# Patient Record
Sex: FEMALE | Race: WHITE
[De-identification: names, ages, dates, MRNs, and addresses within clinical notes are randomized per-mention and may not be internally consistent; named-entity substitution may affect disease eponyms.]

---

## 2018-02-02 ENCOUNTER — HOSPITAL ENCOUNTER (EMERGENCY)
Dept: HOSPITAL 45 - C.EDB | Age: 23
Discharge: HOME | End: 2018-02-02
Payer: COMMERCIAL

## 2018-02-02 VITALS
WEIGHT: 150.13 LBS | WEIGHT: 150.13 LBS | BODY MASS INDEX: 30.27 KG/M2 | HEIGHT: 59.02 IN | HEIGHT: 59.02 IN | BODY MASS INDEX: 30.27 KG/M2

## 2018-02-02 VITALS — HEART RATE: 65 BPM | SYSTOLIC BLOOD PRESSURE: 131 MMHG | DIASTOLIC BLOOD PRESSURE: 72 MMHG | OXYGEN SATURATION: 97 %

## 2018-02-02 VITALS — TEMPERATURE: 98.6 F

## 2018-02-02 DIAGNOSIS — T78.40XA: Primary | ICD-10-CM

## 2018-02-02 DIAGNOSIS — E03.9: ICD-10-CM
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
## 2018-02-02 DIAGNOSIS — Z83.3: ICD-10-CM

## 2018-02-02 DIAGNOSIS — Z79.3: ICD-10-CM

## 2018-02-02 DIAGNOSIS — Z82.49: ICD-10-CM

## 2018-02-02 NOTE — EMERGENCY ROOM VISIT NOTE
History


Report prepared by Jordana:  Madelyn Latham


Under the Supervision of:  Dr. Los Gmóez D.O.


First contact with patient:  12:52


Chief Complaint:  ALLERGIC REACTION


Stated Complaint:  CLOSED THROAT FEELING


Nursing Triage Summary:  


Pt has allergy to raw fruit. Made a smoothie today with raw fruit and tried it 


and feels like her throat is scratchy and tight, not getting worse, no 

difficult 


breathing, but throat tightness persists after taking benadryl at home.





History of Present Illness


The patient is a 22 year old female who presents to the Emergency Room with 

complaints of episodic allergic reaction for two hours. She states that she 

drank a smoothie with strawberries and other fresh fruit this morning and her 

throat began to tighten and became itchy. She felt jittery, though reports 

drinking coffee this morning. She has consumed smoothies in the past without 

issue, though notes similar symptoms when she was 15 year old. She states that 

her throat swelled when she at fruit at 15 years old, so she stopped eating 

them. She denies any rashes. She took a Benadryl at the initial start of the 

symptoms, though no relief. She denies any shortness of breath. She denies any 

history of diabetes. She has a history of hypothyroidism.





   Source of History:  patient


   Onset:  two hours


   Position:  other (global )


   Quality:  other (allergic reaction)


   Timing:  other (episodic)


   Associated Symptoms:  No SOB, No rash


Note:


She notes throat tightening and itchy.





Review of Systems


See HPI for pertinent positives & negatives. A total of 10 systems reviewed and 

were otherwise negative.





Past Medical & Surgical


Medical Problems:


(1) Hypothyroidism








Family History





Diabetes mellitus


Hypertension





Social History


Smoking Status:  Never Smoker


Smokeless Tobacco Use:  No


Alcohol Use:  occasionally


Marital Status:  single


Housing Status:  lives with roommate


Occupation Status:  student





Current/Historical Medications


Scheduled


Birth Control Pills (Birth Control Pills), 1 TAB PO DAILY


Methimazole (Methimazole), Unknown Dose PO DAILY





Allergies


Coded Allergies:  


     Azithromycin (Unverified  Allergy, Unknown, SWELLING, 2/2/18)





Physical Exam


Vital Signs











  Date Time  Temp Pulse Resp B/P (MAP) Pulse Ox O2 Delivery O2 Flow Rate FiO2


 


2/2/18 13:34  65 18 131/72 97   


 


2/2/18 12:49     95 Room Air  


 


2/2/18 12:48 37.0 75 18 142/85 95 Room Air  











Physical Exam


CONSTITUTIONAL/VITAL SIGNS: Reviewed / noted above.


GENERAL: Non-toxic in appearance. 


INTEGUMENTARY: Warm, dry, and Pink.


HEAD: Normocephalic.


EYES: without scleral icterus or trauma.


ENT/OROPHARYNX: clear and moist.


LYMPHADENOPATHY/NECK: Is supple without lymphadenopathy or meningismus.


RESPIRATORY: Lungs clear and equal.


CARDIOVASCULAR: Regular rate and rhythm.


GI/ABDOMEN: Soft and nontender. No organomegaly or pulsatile mass. No rebound 

or guarding. Normal bowel sounds.


EXTREMITIES: Warm and well perfused.


BACK: No CVA tenderness.


NEUROLOGICAL: Intact without focal deficits. 


PSYCHIATRIC: normal affect.


MUSCULOSKELETAL: Normally developed with good muscle tone.





Medical Decision & Procedures


Medications Administered











 Medications


  (Trade)  Dose


 Ordered  Sig/Marce


 Route  Start Time


 Stop Time Status Last Admin


Dose Admin


 


 Dexamethasone


 Sodium Phosphate


  (Dexamethasone


 Inj **Pf**)  10 mg  STK-MED ONCE


 .ROUTE  2/2/18 13:24


 2/2/18 13:25 DC 2/2/18 13:24


5 MG











ED Course


1254: Previous medical records were reviewed. The patient was evaluated in room 

C11B. A complete history and physical examination was performed. I discussed 

the results and treatment plan with the patient. I answered all pertaining 

questions that she had. She expressed understanding and verbalized agreement. 

The patient will be discharged home.





1315: Ordered Decadron 5 mg IM





Medical Decision


Differential includes anaphylaxis, allergic reaction, airway obstruction, and 

infection.











This is a 22-year-old female who presents to the ED with a chief complaint of 

possible allergic reaction to fresh fruits.  The patient states that she had a 

smoothie that involved fresh strawberries and began to feel tingling in her 

throat.  She took a Benadryl around 11 AM shortly after her symptoms started.  

She came in for evaluation because it persisted.  The patient's exam was 

unremarkable.  She denied having any hives or other symptoms of allergic 

reaction.  The patient did not have any evidence of oropharyngeal erythema or 

mucous membrane involvement.  She was given an IM injection of Decadron to help 

with her symptoms.  She was felt to be stable for discharge.





Medication Reconcilliation


Current Medication List:  was personally reviewed by me





Blood Pressure Screening


Patient's blood pressure:  Elevated blood pressure


Blood pressure disposition:  Elevated BP felt to be situational





Impression





 Primary Impression:  


 Allergic reaction





Scribe Attestation


The scribe's documentation has been prepared under my direction and personally 

reviewed by me in its entirety. I confirm that the note above accurately 

reflects all work, treatment, procedures, and medical decision making performed 

by me.





Departure Information


Dispostion


Home / Self-Care





Referrals


No Doctor, Assigned (PCP)





Forms


HOME CARE DOCUMENTATION FORM,                                                 

               IMPORTANT VISIT INFORMATION





Patient Instructions


My [facility]





Additional Instructions











Follow-up with your doctor for further care and evaluation in 1-2 days.  Return 

to the emergency department for worsening or new symptoms or any concerns.


You have been examined and treated today on an emergency basis only. This is 

not a substitute for, or an effort to provide, complete comprehensive medical 

care. It is impossible to recognize and treat all injuries or illnesses in a 

single emergency department visit. It is therefore important that you follow up 

closely with your doctor.  Call as soon as possible for an appointment.





Take Benadryl every 6 hours as needed for itching or tingling and throat.

## 2018-04-20 ENCOUNTER — HOSPITAL ENCOUNTER (EMERGENCY)
Dept: HOSPITAL 45 - C.EDB | Age: 23
Discharge: HOME | End: 2018-04-20
Payer: COMMERCIAL

## 2018-04-20 VITALS
BODY MASS INDEX: 29.33 KG/M2 | HEIGHT: 59.02 IN | HEIGHT: 59.02 IN | BODY MASS INDEX: 29.33 KG/M2 | WEIGHT: 145.51 LBS | WEIGHT: 145.51 LBS

## 2018-04-20 VITALS — TEMPERATURE: 98.6 F

## 2018-04-20 VITALS — SYSTOLIC BLOOD PRESSURE: 121 MMHG | HEART RATE: 82 BPM | DIASTOLIC BLOOD PRESSURE: 61 MMHG | OXYGEN SATURATION: 98 %

## 2018-04-20 DIAGNOSIS — Z79.3: ICD-10-CM

## 2018-04-20 DIAGNOSIS — Z79.899: ICD-10-CM

## 2018-04-20 DIAGNOSIS — R20.2: Primary | ICD-10-CM

## 2018-04-20 DIAGNOSIS — Z88.1: ICD-10-CM

## 2018-04-20 DIAGNOSIS — E03.9: ICD-10-CM

## 2018-04-20 DIAGNOSIS — I49.3: ICD-10-CM

## 2018-04-20 DIAGNOSIS — R51: ICD-10-CM

## 2018-04-20 LAB
ALBUMIN SERPL-MCNC: 3.9 GM/DL (ref 3.4–5)
ALP SERPL-CCNC: 100 U/L (ref 45–117)
ALT SERPL-CCNC: 20 U/L (ref 12–78)
AST SERPL-CCNC: 21 U/L (ref 15–37)
BASOPHILS # BLD: 0.01 K/UL (ref 0–0.2)
BASOPHILS NFR BLD: 0.1 %
BUN SERPL-MCNC: 11 MG/DL (ref 7–18)
CALCIUM SERPL-MCNC: 9.1 MG/DL (ref 8.5–10.1)
CO2 SERPL-SCNC: 24 MMOL/L (ref 21–32)
CREAT SERPL-MCNC: 0.74 MG/DL (ref 0.6–1.2)
EOS ABS #: 0 K/UL (ref 0–0.5)
EOSINOPHIL NFR BLD AUTO: 369 K/UL (ref 130–400)
GLUCOSE SERPL-MCNC: 76 MG/DL (ref 70–99)
HCT VFR BLD CALC: 41.3 % (ref 37–47)
HGB BLD-MCNC: 14.2 G/DL (ref 12–16)
IG#: 0.02 K/UL (ref 0–0.02)
IMM GRANULOCYTES NFR BLD AUTO: 30.3 %
LYMPHOCYTES # BLD: 2.56 K/UL (ref 1.2–3.4)
MCH RBC QN AUTO: 29.5 PG (ref 25–34)
MCHC RBC AUTO-ENTMCNC: 34.4 G/DL (ref 32–36)
MCV RBC AUTO: 85.9 FL (ref 80–100)
MONO ABS #: 0.57 K/UL (ref 0.11–0.59)
MONOCYTES NFR BLD: 6.7 %
NEUT ABS #: 5.3 K/UL (ref 1.4–6.5)
NEUTROPHILS # BLD AUTO: 0 %
NEUTROPHILS NFR BLD AUTO: 62.7 %
PMV BLD AUTO: 11 FL (ref 7.4–10.4)
POTASSIUM SERPL-SCNC: 3.4 MMOL/L (ref 3.5–5.1)
PROT SERPL-MCNC: 9 GM/DL (ref 6.4–8.2)
RED CELL DISTRIBUTION WIDTH CV: 12.9 % (ref 11.5–14.5)
RED CELL DISTRIBUTION WIDTH SD: 40.8 FL (ref 36.4–46.3)
SODIUM SERPL-SCNC: 136 MMOL/L (ref 136–145)
WBC # BLD AUTO: 8.46 K/UL (ref 4.8–10.8)

## 2018-04-20 NOTE — DIAGNOSTIC IMAGING REPORT
CHEST ONE VIEW PORTABLE



CLINICAL HISTORY: racing hr cardiac arrhythmia



COMPARISON STUDY:  No previous studies for comparison.



FINDINGS: The bones soft tissues and hemidiaphragms are normal. The

cardiomediastinal silhouette is normal. The lungs are clear. The pulmonary

vasculature is normal. 



IMPRESSION:  Negative chest. 











The above report was generated using voice recognition software.  It may contain

grammatical, syntax or spelling errors.









Electronically signed by:  Kenny Pérez M.D.

4/20/2018 9:07 PM



Dictated Date/Time:  4/20/2018 9:07 PM

## 2018-04-20 NOTE — DIAGNOSTIC IMAGING REPORT
HEAD WITHOUT CONTRAST (CT)



CT DOSE: 537.48 mGy.cm



HISTORY: Mental status change. Paresthesias.  parestesias



TECHNIQUE: Multiaxial CT images of the head were performed without the use of

intravenous contrast.  A dose lowering technique was utilized adhering to the

principles of ALARA.





Comparison: None.



Findings: The paranasal sinuses and mastoid air cells are clear. The calvarium

and skull base are intact. The ventricles and sulci are within normal limits.

There is no mass, hematoma, midline shift, or acute infarct.



Impression:

No acute intracranial abnormality. 











The above report was generated using voice recognition software.  It may contain

grammatical, syntax or spelling errors.







Electronically signed by:  Kenny Pérez M.D.

4/20/2018 9:39 PM



Dictated Date/Time:  4/20/2018 9:38 PM

## 2018-04-20 NOTE — EMERGENCY ROOM VISIT NOTE
History


Report prepared by Jordana:  Carlito Fernandez


Under the Supervision of:  Dr. Los Gómez D.O.


First contact with patient:  20:28


Chief Complaint:  RAPID HEART RATE


Stated Complaint:  NUMBNESS ON LEFT SIDE BODY/ RAPID HEART BEAT


Nursing Triage Summary:  


patient to Ed via triage, states "I had some caffeine this morning and then my 


heart felt like it was racing, my left arm neeru went numb and was tingly."





History of Present Illness


The patient is a 22 year old female who presents to the Emergency Room with 

complaints of constant numbness beginning today. The patient states that she 

had coffee this morning which caused her heart rate to increase and make her 

feel jittery. She notes that she began to feel numbness and tingling in her 

left arm, left cheek, right hand, as well as a dry left eye. She reports that 

she also has a headache but did not get much sleep last night and believes that 

her headache might be related to her lack of sleep. The patient states that she 

has a history of hypothyroidism and takes methimazole. She notes that she has 

not experienced similar symptoms in the past.





   Source of History:  patient


   Onset:  today


   Position:  arm (left), hand (right), other (Left cheek)


   Quality:  numbness


   Timing:  constant


   Associated Symptoms:  + headache


Note:


The patient also complains of an increased heart rate and a dry left eye.





Review of Systems


See HPI for pertinent positives & negatives. A total of 10 systems reviewed and 

were otherwise negative.





Past Medical & Surgical


Medical Problems:


(1) Hypothyroidism








Family History





Diabetes mellitus


Hypertension





Social History


Smoking Status:  Never Smoker


Alcohol Use:  occasionally


Marital Status:  single


Housing Status:  lives with roommate


Occupation Status:  [employer] student





Current/Historical Medications


Scheduled


Birth Control Pills (Birth Control Pills), 1 TAB PO DAILY


Bismuth Subsalicylate (Pepto Bismol Chew Tab), 1-2 TAB PO PRN UD


Methimazole (Methimazole), 10 MG PO TID


Probiotic Product (Probiotic), 1 CAP PO DAILY





Allergies


Coded Allergies:  


     Azithromycin (Unverified  Allergy, Unknown, SWELLING, 2/2/18)





Physical Exam


Vital Signs











  Date Time  Temp Pulse Resp B/P (MAP) Pulse Ox O2 Delivery O2 Flow Rate FiO2


 


4/20/18 20:47  91      


 


4/20/18 20:43  85 14 126/67 99 Room Air  


 


4/20/18 18:22     100 Room Air  


 


4/20/18 18:20 37.0 83 20 151/85 100 Room Air  











Physical Exam


VITAL SIGNS: were reviewed as above.


GENERAL:Non-toxic in appearance.


SKIN: Warm dry and pink.


HEAD: Normocephalic and atraumatic.


OROPHARYNX: Is clear and moist


NECK: Supple without lymphadenopathy or meningismus.


LUNGS: clear.


HEART: Regular rate and rhythm.


ABDOMEN: Soft and nontender.


EXTREMITIES: Warm and well perfused.


NEUROLOGICALLY: Awake alert and oriented without focal deficit. Cranial nerves 2

-12 are intact. There is no pronator drift. Cerebellar testing is within normal 

limits. There is no nystagmus. There is no facial droop. Speech is clear. 

Vision is grossly normal.


MUSCULOSKELETAL: Good muscle tone. No evidence of trauma.





Medical Decision & Procedures


ER Provider


Diagnostic Interpretation:


Radiology results as stated below per my review and radiologist interpretation:





CHEST ONE VIEW PORTABLE





FINDINGS: The bones soft tissues and hemidiaphragms are normal. The


cardiomediastinal silhouette is normal. The lungs are clear. The pulmonary


vasculature is normal. 





IMPRESSION:  Negative chest. 





The above report was generated using voice recognition software.  It may contain


grammatical, syntax or spelling errors.





Electronically signed by:  Kenny Pérez M.D.


4/20/2018 9:07 PM





HEAD WITHOUT CONTRAST (CT)





Findings: The paranasal sinuses and mastoid air cells are clear. The calvarium


and skull base are intact. The ventricles and sulci are within normal limits.


There is no mass, hematoma, midline shift, or acute infarct.





Impression:


No acute intracranial abnormality. 





The above report was generated using voice recognition software.  It may contain


grammatical, syntax or spelling errors.





Electronically signed by:  Kenny Pérez M.D.


4/20/2018 9:39 PM





Laboratory Results


4/20/18 19:33








Red Blood Count 4.81, Mean Corpuscular Volume 85.9, Mean Corpuscular Hemoglobin 

29.5, Mean Corpuscular Hemoglobin Concent 34.4, Mean Platelet Volume 11.0, 

Neutrophils (%) (Auto) 62.7, Lymphocytes (%) (Auto) 30.3, Monocytes (%) (Auto) 

6.7, Eosinophils (%) (Auto) 0.0, Basophils (%) (Auto) 0.1, Neutrophils # (Auto) 

5.30, Lymphocytes # (Auto) 2.56, Monocytes # (Auto) 0.57, Eosinophils # (Auto) 

0.00, Basophils # (Auto) 0.01





4/20/18 19:33

















Test


  4/20/18


19:28 4/20/18


19:33


 


Urine Color YELLOW  


 


Urine Appearance CLEAR (CLEAR)  


 


Urine pH 6.0 (4.5-7.5)  


 


Urine Specific Gravity


  1.012


(1.000-1.030) 


 


 


Urine Protein NEG (NEG)  


 


Urine Glucose (UA) NEG (NEG)  


 


Urine Ketones NEG (NEG)  


 


Urine Occult Blood NEG (NEG)  


 


Urine Nitrite NEG (NEG)  


 


Urine Bilirubin NEG (NEG)  


 


Urine Urobilinogen NEG (NEG)  


 


Urine Leukocyte Esterase TRACE (NEG)  


 


Urine WBC (Auto) 1-5 /hpf (0-5)  


 


Urine RBC (Auto) 0-4 /hpf (0-4)  


 


Urine Hyaline Casts (Auto) 0 /lpf (0-5)  


 


Urine Epithelial Cells (Auto)


  10-20 /lpf


(0-5) 


 


 


Urine Bacteria (Auto) NEG (NEG)  


 


White Blood Count


  


  8.46 K/uL


(4.8-10.8)


 


Red Blood Count


  


  4.81 M/uL


(4.2-5.4)


 


Hemoglobin


  


  14.2 g/dL


(12.0-16.0)


 


Hematocrit  41.3 % (37-47) 


 


Mean Corpuscular Volume


  


  85.9 fL


()


 


Mean Corpuscular Hemoglobin


  


  29.5 pg


(25-34)


 


Mean Corpuscular Hemoglobin


Concent 


  34.4 g/dl


(32-36)


 


Platelet Count


  


  369 K/uL


(130-400)


 


Mean Platelet Volume


  


  11.0 fL


(7.4-10.4)


 


Neutrophils (%) (Auto)  62.7 % 


 


Lymphocytes (%) (Auto)  30.3 % 


 


Monocytes (%) (Auto)  6.7 % 


 


Eosinophils (%) (Auto)  0.0 % 


 


Basophils (%) (Auto)  0.1 % 


 


Neutrophils # (Auto)


  


  5.30 K/uL


(1.4-6.5)


 


Lymphocytes # (Auto)


  


  2.56 K/uL


(1.2-3.4)


 


Monocytes # (Auto)


  


  0.57 K/uL


(0.11-0.59)


 


Eosinophils # (Auto)


  


  0.00 K/uL


(0-0.5)


 


Basophils # (Auto)


  


  0.01 K/uL


(0-0.2)


 


RDW Standard Deviation


  


  40.8 fL


(36.4-46.3)


 


RDW Coefficient of Variation


  


  12.9 %


(11.5-14.5)


 


Immature Granulocyte % (Auto)  0.2 % 


 


Immature Granulocyte # (Auto)


  


  0.02 K/uL


(0.00-0.02)


 


Anion Gap


  


  8.0 mmol/L


(3-11)


 


Est Creatinine Clear Calc


Drug Dose 


  98.5 ml/min 


 


 


Estimated GFR (


American) 


  133.3 


 


 


Estimated GFR (Non-


American 


  115.0 


 


 


BUN/Creatinine Ratio  14.6 (10-20) 


 


Calcium Level


  


  9.1 mg/dl


(8.5-10.1)


 


Total Bilirubin


  


  0.3 mg/dl


(0.2-1)


 


Aspartate Amino Transf


(AST/SGOT) 


  21 U/L (15-37) 


 


 


Alanine Aminotransferase


(ALT/SGPT) 


  20 U/L (12-78) 


 


 


Alkaline Phosphatase


  


  100 U/L


()


 


Troponin I


  


  < 0.015 ng/ml


(0-0.045)


 


Total Protein


  


  9.0 gm/dl


(6.4-8.2)


 


Albumin


  


  3.9 gm/dl


(3.4-5.0)


 


Globulin


  


  5.1 gm/dl


(2.5-4.0)


 


Albumin/Globulin Ratio  0.8 (0.9-2) 


 


Thyroid Stimulating Hormone


(TSH) 


  2.050 uIu/ml


(0.300-4.500)





Laboratory results as stated above per my review.





ECG Per My Interpretation


Indication:  other (numbness)


Rate (beats per minute):  89


Rhythm:  sinus rhythm


Findings:  PVC, other (No ST elevation)





ED Course


2040: Previous medical records were reviewed. The patient was evaluated in room 

C8. A complete history and physical examination was performed.





2155: On reevaluation, the patient is stable. I discussed the results and 

findings with the patient. She verbalized agreement of the treatment plan. The 

patient was discharged home.





Medical Decision


Differential includes acute coronary syndrome, myocardial infarction, CVA, TIA, 

anemia, infection, pneumonia, UTI, pyelonephritis, poor nutrition, dehydration, 

electrolyte disturbance,hypoglycemia.





This is a 22-year-old female who presents to the ED with a chief complaint of 

feeling jittery.  The patient states that she also felt some numbness and 

tingling in her left arm and then later her left eye felt a little dry.  She 

reported that she developed some right hand tingling and tingling in her cheek.

  She has had the symptoms at various times today.  She has no other 

complaints.  She reports a very mild headache.  She states that this could be 

related to not sleeping well last night as she was studying.  Her vital signs 

are normal.  Her neurological exam was normal.  CBC and PRP are normal, urine 

did not show infection.  CT scan of the brain did not show acute process.  EKG 

did not show any acute abnormalities.  The patient was told the results.  She 

is felt to be stable for discharge.  The cause of the symptoms are unclear.





Blood Pressure Screening


Patient's blood pressure:  Normal blood pressure


Blood pressure disposition:  Did not require urgent referral





Impression





 Primary Impression:  


 Paresthesia





Scribe Attestation


The scribe's documentation has been prepared under my direction and personally 

reviewed by me in its entirety. I confirm that the note above accurately 

reflects all work, treatment, procedures, and medical decision making performed 

by me.





Departure Information


Dispostion


Home / Self-Care





Referrals


No Doctor, Assigned (PCP)





Forms


HOME CARE DOCUMENTATION FORM,                                                 

               IMPORTANT VISIT INFORMATION, WORK / SCHOOL INSTRUCTIONS





Patient Instructions


My [facility]





Additional Instructions





Your test results did not show a cause for your symptoms.  Follow-up with your 

doctor or [facility] for recheck on Monday if symptoms persist.





Follow-up with your doctor for further care and evaluation in 1-4 days.  Return 

to the emergency department for worsening or new symptoms or any concerns.


You have been examined and treated today on an emergency basis only. This is 

not a substitute for, or an effort to provide, complete comprehensive medical 

care. It is impossible to recognize and treat all injuries or illnesses in a 

single emergency department visit. It is therefore important that you follow up 

closely with your doctor.  Call as soon as possible for an appointment.